# Patient Record
Sex: FEMALE | Race: BLACK OR AFRICAN AMERICAN | NOT HISPANIC OR LATINO | URBAN - METROPOLITAN AREA
[De-identification: names, ages, dates, MRNs, and addresses within clinical notes are randomized per-mention and may not be internally consistent; named-entity substitution may affect disease eponyms.]

---

## 2024-10-25 ENCOUNTER — HOSPITAL ENCOUNTER (EMERGENCY)
Facility: HOSPITAL | Age: 39
Discharge: HOME | End: 2024-10-25
Attending: EMERGENCY MEDICINE
Payer: MEDICAID

## 2024-10-25 VITALS
TEMPERATURE: 98 F | SYSTOLIC BLOOD PRESSURE: 177 MMHG | HEIGHT: 61 IN | RESPIRATION RATE: 16 BRPM | HEART RATE: 100 BPM | DIASTOLIC BLOOD PRESSURE: 110 MMHG | OXYGEN SATURATION: 97 % | BODY MASS INDEX: 55.32 KG/M2 | WEIGHT: 293 LBS

## 2024-10-25 DIAGNOSIS — L30.4 INTERTRIGO: Primary | ICD-10-CM

## 2024-10-25 LAB — PREGNANCY TEST URINE, POC: NEGATIVE

## 2024-10-25 PROCEDURE — 2500000001 HC RX 250 WO HCPCS SELF ADMINISTERED DRUGS (ALT 637 FOR MEDICARE OP): Mod: SE

## 2024-10-25 PROCEDURE — 99283 EMERGENCY DEPT VISIT LOW MDM: CPT

## 2024-10-25 PROCEDURE — 81025 URINE PREGNANCY TEST: CPT

## 2024-10-25 PROCEDURE — 99284 EMERGENCY DEPT VISIT MOD MDM: CPT | Performed by: EMERGENCY MEDICINE

## 2024-10-25 RX ORDER — NYSTATIN 100000 [USP'U]/ML
10 SUSPENSION ORAL 4 TIMES DAILY
Qty: 400 ML | Refills: 0 | Status: SHIPPED | OUTPATIENT
Start: 2024-10-25 | End: 2024-11-04

## 2024-10-25 RX ORDER — NYSTATIN 100000 [USP'U]/G
1 POWDER TOPICAL ONCE
Status: COMPLETED | OUTPATIENT
Start: 2024-10-25 | End: 2024-10-25

## 2024-10-25 RX ADMIN — NYSTATIN 1 APPLICATION: 100000 POWDER TOPICAL at 19:20

## 2024-10-25 ASSESSMENT — COLUMBIA-SUICIDE SEVERITY RATING SCALE - C-SSRS
1. IN THE PAST MONTH, HAVE YOU WISHED YOU WERE DEAD OR WISHED YOU COULD GO TO SLEEP AND NOT WAKE UP?: NO
6. HAVE YOU EVER DONE ANYTHING, STARTED TO DO ANYTHING, OR PREPARED TO DO ANYTHING TO END YOUR LIFE?: NO
2. HAVE YOU ACTUALLY HAD ANY THOUGHTS OF KILLING YOURSELF?: NO

## 2024-10-25 ASSESSMENT — PAIN - FUNCTIONAL ASSESSMENT: PAIN_FUNCTIONAL_ASSESSMENT: 0-10

## 2024-10-25 ASSESSMENT — LIFESTYLE VARIABLES
HAVE YOU EVER FELT YOU SHOULD CUT DOWN ON YOUR DRINKING: NO
HAVE PEOPLE ANNOYED YOU BY CRITICIZING YOUR DRINKING: NO
EVER FELT BAD OR GUILTY ABOUT YOUR DRINKING: NO
EVER HAD A DRINK FIRST THING IN THE MORNING TO STEADY YOUR NERVES TO GET RID OF A HANGOVER: NO
TOTAL SCORE: 0

## 2024-10-25 ASSESSMENT — PAIN SCALES - GENERAL: PAINLEVEL_OUTOF10: 0 - NO PAIN

## 2024-10-25 NOTE — ED PROVIDER NOTES
History of Present Illness   Information Gathering: History collected from patient    HPI:  Maria T Santacruz is a 39 y.o. female with past medical history significant for T2DM and hypertension presenting to the emergency department with concerns of rash. Patient states that over the past week, she has developed a pruritic rash underneath bilateral breasts and in her groin folds. States that rash is not painful and just itchy. No open skin lesions per patient. Denies abnormal vaginal discharge or bleeding. Patient states that she is currently on her period. Patient does not have personal concern for STDs given that she has had the same partner for 11 years. Denies fevers at home. Patient does not take antiepileptics or antibiotics at this time. Denies the use of new shampoos or lotions.    Physical Exam   Triage vitals:  T 36.7 °C (98 °F)    BP (!) 177/110  RR 16  O2 97 % None (Room air)    Physical Exam  Constitutional:       Appearance: Normal appearance.   HENT:      Head: Normocephalic and atraumatic.   Eyes:      Extraocular Movements: Extraocular movements intact.      Pupils: Pupils are equal, round, and reactive to light.   Cardiovascular:      Rate and Rhythm: Normal rate and regular rhythm.   Pulmonary:      Effort: Pulmonary effort is normal.      Breath sounds: Normal breath sounds.   Abdominal:      General: Abdomen is flat.      Palpations: Abdomen is soft.   Musculoskeletal:         General: No swelling or signs of injury. Normal range of motion.   Skin:     Comments: Within patient's inframamillary folds and groin, there is erythematous macerated skin without induration or fluctuance. Lesions are nontender to palpation and pruritic. No ulcerations, lacerations or blistering. Findings consistent with intertrigo   Neurological:      General: No focal deficit present.      Mental Status: She is alert and oriented to person, place, and time.         Medical Decision Making & ED Course   Medical  Decision Makin y.o. female with past medical history significant for T2DM and hypertension presenting to the emergency department with concerns of rash. Outside of hypertension, on presentation, patient is hemodynamically stable, afebrile and saturating well on room air without signs of increased respiratory effort. Lesions are nonpainful and patient does not currently take antiepileptic or antibiotic medications that would place her at increased risk for SJS or TEN. In addition, there is no induration or fluctuance of lesions that are limited to skin folds that would make me concerned for cellulitis or abscess at this time. No bullae to suggest bullous pemphigoid or pemphigus vulgaris. Skin rash also limited to groin folds and does not affect vaginal labia and patient has no abnormal discharge. Patient offered STD testing which she declined. Given that lesions consistent with intertrigo, she was given nystatin powder here in the emergency department and provided with return precautions. Patient verbalized understanding was discharged home in stable condition with prescription for nystatin powder and instructions to keep skin folds dry.    ED Course:  Diagnoses as of 10/25/24 1935   Intertrigo       ----  Independent Result Review and Interpretation: Relevant laboratory and radiographic results were reviewed and independently interpreted by myself.  As necessary, they are commented on in the ED Course.    Chronic conditions affecting the patient's care: As documented above in MDM    The patient was discussed with the following consultants/services: As described in MDM      Disposition   As a result of the work-up, the patient was discharged home.  she was informed of her diagnosis and instructed to come back with any concerns or worsening of condition.  she and was agreeable to the plan as discussed above.  she was given the opportunity to ask questions.  All of the patient's questions were  answered.    Procedures   Procedures    Patient seen and discussed with ED attending physician.    Uriel Mendoza MD  Emergency Medicine, PGY-2      Walter Mendoza MD  Resident  10/26/24 0140

## 2024-10-25 NOTE — ED TRIAGE NOTES
Pt presents to the ED c/o rash on her groin and breasts. Pt states its been there for about a week and she feels like it may be starting to spread to her back.